# Patient Record
Sex: FEMALE | Race: WHITE
[De-identification: names, ages, dates, MRNs, and addresses within clinical notes are randomized per-mention and may not be internally consistent; named-entity substitution may affect disease eponyms.]

---

## 2022-01-03 ENCOUNTER — HOSPITAL ENCOUNTER (INPATIENT)
Dept: HOSPITAL 79 - MED SURG 4 | Age: 72
LOS: 7 days | Discharge: HOME | DRG: 337 | End: 2022-01-10
Attending: SURGERY | Admitting: SURGERY
Payer: MEDICARE

## 2022-01-03 VITALS — WEIGHT: 194 LBS | HEIGHT: 69 IN | BODY MASS INDEX: 28.73 KG/M2

## 2022-01-03 DIAGNOSIS — Z90.49: ICD-10-CM

## 2022-01-03 DIAGNOSIS — Z79.01: ICD-10-CM

## 2022-01-03 DIAGNOSIS — I48.91: ICD-10-CM

## 2022-01-03 DIAGNOSIS — J44.9: ICD-10-CM

## 2022-01-03 DIAGNOSIS — R10.9: Primary | ICD-10-CM

## 2022-01-03 DIAGNOSIS — Z85.42: ICD-10-CM

## 2022-01-03 DIAGNOSIS — Z90.710: ICD-10-CM

## 2022-01-03 DIAGNOSIS — E11.9: ICD-10-CM

## 2022-01-04 LAB
BUN/CREATININE RATIO: 19 (ref 0–10)
HGB BLD-MCNC: 13.5 GM/DL (ref 12.3–15.3)
RED BLOOD COUNT: 4.4 M/UL (ref 4–5.1)
WHITE BLOOD COUNT: 8.6 K/UL (ref 4.5–11)

## 2022-01-04 PROCEDURE — 0DN80ZZ RELEASE SMALL INTESTINE, OPEN APPROACH: ICD-10-PCS | Performed by: SURGERY

## 2022-01-04 NOTE — NUR
APPLIED SCUDS TO PT'S BILATERAL LOWER EXTREMITIES. PLACED AN INCENTIVE
SPIROMETER ON PT'S BEDSIDE TABLE. INSTRUCTED PT ON HOW TO PROPERLY USE
INCENTIVE SPIROMETER. PROVIDED EDUCATION ON IMPORTANCE OF USING INCENTIVE
SPIROMETER.

## 2022-01-04 NOTE — NUR
PT RETURNED FROM OR TO ROOM 4115. PT STILL ON O2 AT 2L NC. PT STABLE AT THE
TIME OF RETURN. ALL VITAL SIGNS WITHIN NORMAL LIMITS. PT HAS A NEW VERTICAL
INCISION TO LOWER MIDLINE OF ABDOMEN WITH DERMABOND. INCISION SITE IS CLEAN,
DRY, AND INTACT. NO SIGNS OF DRAINAGE NOTED. ALL FALL PRECAUTIONS REAPPLIED.
BED ALARM AND STRIP ALARM TURNED BACK ON.

## 2022-01-10 NOTE — NUR
SPOKE WITH DR. MARTINI.  HOME MEDS REVIEWED FOR DISCHARGE WITH DR. MARTINI.  ALL
MEDS CONTINUED PER PHONE CONVERSATION.   GARO DAVISN/RN